# Patient Record
Sex: FEMALE | URBAN - METROPOLITAN AREA
[De-identification: names, ages, dates, MRNs, and addresses within clinical notes are randomized per-mention and may not be internally consistent; named-entity substitution may affect disease eponyms.]

---

## 2023-05-05 ENCOUNTER — LAB OUTSIDE AN ENCOUNTER (OUTPATIENT)
Dept: URBAN - METROPOLITAN AREA CLINIC 31 | Facility: CLINIC | Age: 48
End: 2023-05-05

## 2023-05-05 ENCOUNTER — DASHBOARD ENCOUNTERS (OUTPATIENT)
Age: 48
End: 2023-05-05

## 2023-05-05 ENCOUNTER — OFFICE VISIT (OUTPATIENT)
Dept: URBAN - METROPOLITAN AREA CLINIC 31 | Facility: CLINIC | Age: 48
End: 2023-05-05
Payer: COMMERCIAL

## 2023-05-05 ENCOUNTER — WEB ENCOUNTER (OUTPATIENT)
Dept: URBAN - METROPOLITAN AREA CLINIC 31 | Facility: CLINIC | Age: 48
End: 2023-05-05

## 2023-05-05 VITALS
HEART RATE: 73 BPM | OXYGEN SATURATION: 97 % | WEIGHT: 157 LBS | SYSTOLIC BLOOD PRESSURE: 120 MMHG | DIASTOLIC BLOOD PRESSURE: 78 MMHG | HEIGHT: 64 IN | BODY MASS INDEX: 26.8 KG/M2

## 2023-05-05 DIAGNOSIS — Z12.11 COLON CANCER SCREENING: ICD-10-CM

## 2023-05-05 DIAGNOSIS — R10.32 ABDOMINAL CRAMPING IN LEFT LOWER QUADRANT: ICD-10-CM

## 2023-05-05 PROCEDURE — 99202 OFFICE O/P NEW SF 15 MIN: CPT | Performed by: INTERNAL MEDICINE

## 2023-05-05 PROCEDURE — 99203 OFFICE O/P NEW LOW 30 MIN: CPT | Performed by: INTERNAL MEDICINE

## 2023-05-05 PROCEDURE — 99243 OFF/OP CNSLTJ NEW/EST LOW 30: CPT | Performed by: INTERNAL MEDICINE

## 2023-05-05 RX ORDER — ACETYLCYSTEINE 600 MG
AS DIRECTED CAPSULE ORAL
Status: ACTIVE | COMMUNITY

## 2023-05-05 RX ORDER — SODIUM, POTASSIUM,MAG SULFATES 17.5-3.13G
177 ML AS DIRECTED SOLUTION, RECONSTITUTED, ORAL ORAL TWICE A DAY
Qty: 354 ML | Refills: 0 | OUTPATIENT
Start: 2023-05-05 | End: 2023-05-06

## 2023-05-05 NOTE — HPI-DIVERTICULITIS
47  Year old female patient presents today for abdominal pain that was located in her LLQ. The patient stated that her ABD pain has since subsided. She describes the pain as a very severe pain. Pain onset was in Dec of 2022, however it stopped in January but then returned in February and March of this year. She denies any symptoms at this time and admits to never being dx with diverticulitis. Denies any aggravating factors. Admits stool softeners, probiotics and a change in her diet as alleviating factors. She states that she had an ABD US and a pap smear done back in January yielding negative results. She recently had lab work done at [facility] in [place] she does not have results yet.   She also presents today for a colorectal cancer screening. Patient admits this will be her first colonoscopy. She denies a family history of colon, gastric, or esophageal cancer/polyps. Currently,  she reports at least one to two bowel movements per day without strain. Her stools are mostly formed and depending on her diet they can be semi-formed without blood, mucus, or melena. She denies any episodes of rectal pain or pruritus ani.  GI MD: Episode of pain in Nov, December,  and March. First 2 episodes were at the time of menstrual periods but not the last 2. First episode lasted 1 hour. In December lasted 2 hours. The episode in March was severe and lasted 3-4 hours. Prior BM's were a bit irregular; may skip a day. Now, since changing her diet she is more regular. She is drinking 100 oz water a day. She is eating a lot of vegetables, exercising, and having regular BM's now, and no more pain  Pelvic US was NL earlier this year

## 2023-05-10 ENCOUNTER — TELEPHONE ENCOUNTER (OUTPATIENT)
Dept: URBAN - METROPOLITAN AREA CLINIC 33 | Facility: CLINIC | Age: 48
End: 2023-05-10

## 2023-06-05 ENCOUNTER — OFFICE VISIT (OUTPATIENT)
Dept: URBAN - METROPOLITAN AREA SURGERY CENTER 8 | Facility: SURGERY CENTER | Age: 48
End: 2023-06-05

## 2023-06-09 ENCOUNTER — WEB ENCOUNTER (OUTPATIENT)
Dept: URBAN - METROPOLITAN AREA SURGERY CENTER 8 | Facility: SURGERY CENTER | Age: 48
End: 2023-06-09

## 2023-06-12 ENCOUNTER — OFFICE VISIT (OUTPATIENT)
Dept: URBAN - METROPOLITAN AREA SURGERY CENTER 8 | Facility: SURGERY CENTER | Age: 48
End: 2023-06-12
Payer: COMMERCIAL

## 2023-06-12 ENCOUNTER — CLAIMS CREATED FROM THE CLAIM WINDOW (OUTPATIENT)
Dept: URBAN - METROPOLITAN AREA CLINIC 4 | Facility: CLINIC | Age: 48
End: 2023-06-12
Payer: COMMERCIAL

## 2023-06-12 DIAGNOSIS — Z12.11 COLON CANCER SCREENING: ICD-10-CM

## 2023-06-12 DIAGNOSIS — K63.89 OTHER SPECIFIED DISEASES OF INTESTINE: ICD-10-CM

## 2023-06-12 DIAGNOSIS — K63.5 BENIGN COLON POLYP: ICD-10-CM

## 2023-06-12 PROCEDURE — 00812 ANES LWR INTST SCR COLSC: CPT | Performed by: NURSE ANESTHETIST, CERTIFIED REGISTERED

## 2023-06-12 PROCEDURE — 88305 TISSUE EXAM BY PATHOLOGIST: CPT | Performed by: PATHOLOGY

## 2023-06-12 PROCEDURE — 45380 COLONOSCOPY AND BIOPSY: CPT | Performed by: INTERNAL MEDICINE

## 2023-06-12 PROCEDURE — G8907 PT DOC NO EVENTS ON DISCHARG: HCPCS | Performed by: INTERNAL MEDICINE

## 2023-06-20 ENCOUNTER — TELEPHONE ENCOUNTER (OUTPATIENT)
Dept: URBAN - METROPOLITAN AREA CLINIC 35 | Facility: CLINIC | Age: 48
End: 2023-06-20

## 2023-07-06 ENCOUNTER — OFFICE VISIT (OUTPATIENT)
Dept: URBAN - METROPOLITAN AREA CLINIC 33 | Facility: CLINIC | Age: 48
End: 2023-07-06

## 2023-07-06 NOTE — HPI-COLONOSCOPY FOLLOWUP
47  Year old female patient presents today for a follow up from her colonoscopy. She admits/denies any complications after her procedure. She currently reports -- bowel movements per --, with/without strain. Her stools are -- and --. She admits/denies any mucus, melena or blood in stools. Admits/Denies any pruritus ani or rectal pain.   Impression Report:  Diverticulosis in the sigmoid colon, in the descending colon and at the splenic flexure. - One 3 mm polyp at the hepatic flexure, removed with a cold biopsy forceps.  Resected and retrieved. - Non-bleeding internal hemorrhoids   Pathology Report:  olon, Hepatic Flexure, Polypectomy: PROMINENT MUCOSAL LYMPHOID AGGREGATES.  Negative for Dysplasia or Malignancy.